# Patient Record
(demographics unavailable — no encounter records)

---

## 2024-10-10 NOTE — HISTORY OF PRESENT ILLNESS
[FreeTextEntry1] : Mr. SEVILLA 77 year old male presents for a new patient evaluation. Comes today with his wife. They come today for suspected stroke. They endorse over the last 2-3 years had 2-3 episodes where he would stare off and appeared frozen. Most recent episode occurred in the car, and was about 6-8 weeks ago. He refused to seek medical treatment at the time. Wife also notes over the past year he has weakness in the right leg, she finds him dragging the leg. Has been experiencing gait changes over the past few years. Wife also endorses falls. Was previously on gabapentin but never did PT. His lower back imaging in 2016 showed L4-L5 severe spinal canal stenosis 2/2 chronic degenerative changes,7mm synovial cyst emanating from the right facet joint at the level w extension to the right dorsal epidural space contributing to spinal canal stenosis and additional multilevel degenerative changes. He denies changes in bladder pattern as well as saddle anesthesia. Denies changes in his vision, speech, ha, or dizziness. He has numbness in BL feet localized to the toes which is chronic for many years. Some episodes of falling asleep while sitting, but does endorse snoring.

## 2024-10-10 NOTE — PHYSICAL EXAM
[FreeTextEntry1] : Mental status: Awake, alert and oriented x3.  Follows commands. Flat affect. No dysarthria, no aphasia.   Cranial nerves: Pupils equally round and reactive to light, visual fields full, no nystagmus, extraocular muscles intact, V1 through V3 intact bilaterally and symmetric, face symmetric, hearing intact to finger rub, palate elevation symmetric, tongue was midline. Motor:  MRC grading 5/5 b/l UE/LE.   strength 5/5.  Normal tone and bulk.  No abnormal movements. No bradykinesias. Sensation: Intact to light touch, temperature, vibration, and pinprick, except vibration absent at the toes BL Coordination: No dysmetria on finger-to-nose. Reflexes: 2+ in bilateral UE/LE. Gait: Wide based gait. Difficulty stepping on and off exam table. No ataxia.  Romberg negative  Repeat /72

## 2024-10-10 NOTE — ASSESSMENT
[FreeTextEntry1] : Mr. SEVILLA 77 year old male presents for a new patient evaluation for episodes of staring off/ appearing frozen, and ?right leg weakness/ gait changes. Chronic lumbar radiculopathy may be contributing to gait change. Will order MRI L spine to follow up on known radiculopathy as well as MRI head w/o to ensure no central events causing episodes of staring off/freezing and 48 hr EEG   Plan -MRI L spine w/o  -PT for balance and gait training -MRI head w/o  -48hr AEEG/REEG -Complementary neuropathy labs for chronic sensory changes in feet -Return in 2-3 mos with neurology attending

## 2025-04-24 NOTE — HISTORY OF PRESENT ILLNESS
[FreeTextEntry1] : Since last visit he had testing done below.  He did not do the physical therapy for his lower back.  He did not talk to his cardiologist about his syncopal episodes.  He had one episode recently where he was walking and then wife says he suddenly passed out.  woke up quickly afterwards but hit the left side of head  MRI brain done on 10/18/2024 was unremarkable MRI Lumbar spine done on 10/21/2024 showed severe spinal stenosis at L4/L5 and moderate stenosis at L3/L4 Ambulatory EEG 72 hour done on 10/28/2024 was normal  From initial evaluation by CANDELARIO Kruse on 10/10/2024: Mr. SEVILLA 77 year old male presents for a new patient evaluation. Comes today with his wife. They come today for suspected stroke. They endorse over the last 2-3 years had 2-3 episodes where he would stare off and appeared frozen. Most recent episode occurred in the car, and was about 6-8 weeks ago. He refused to seek medical treatment at the time. Wife also notes over the past year he has weakness in the right leg, she finds him dragging the leg. Has been experiencing gait changes over the past few years. Wife also endorses falls. Was previously on gabapentin but never did PT. His lower back imaging in 2016 showed L4-L5 severe spinal canal stenosis 2/2 chronic degenerative changes,7mm synovial cyst emanating from the right facet joint at the level w extension to the right dorsal epidural space contributing to spinal canal stenosis and additional multilevel degenerative changes. He denies changes in bladder pattern as well as saddle anesthesia. Denies changes in his vision, speech, ha, or dizziness. He has numbness in BL feet localized to the toes which is chronic for many years. Some episodes of falling asleep while sitting, but does endorse snoring.

## 2025-04-24 NOTE — ASSESSMENT
[FreeTextEntry1] : Mr. SEVILLA 78 year old male presents for followup of syncopal episodes and gait issues. The syncopal episodes dont appear to be neurological and may still be related to cardiac causes.  Would suggest 30d event and if negative ILR with EPS.  Gait issues likely related to weakness in right foot dorsiflexion and due too severe spinal stenosis.   Plan -PT for lumbar radiculopathy -Return in 3 mos with neurology attending

## 2025-07-28 NOTE — PHYSICAL EXAM
[FreeTextEntry1] : Mental status: Awake, alert and oriented x3. Follows commands. Flat affect. No dysarthria, no aphasia. Cranial nerves: Pupils equally round and reactive to light, visual fields full, no nystagmus, extraocular muscles intact, V1 through V3 intact bilaterally and symmetric, face symmetric, hearing intact to finger rub, palate elevation symmetric, tongue was midline. Motor: MRC grading 5/5 b/l UE/LE.  strength 5/5. Normal tone and bulk. No abnormal movements.  Sensation: Intact to light touch, temperature, vibration, and pinprick above the ankles Coordination: No dysmetria on finger-to-nose. Reflexes: 2+ in bilateral UE / LE. Gait: Wide based gait. Difficulty stepping on and off exam table. No ataxia.

## 2025-07-28 NOTE — ASSESSMENT
[FreeTextEntry1] : Mr. SEVILLA 78 year old male presents for a follow up visit established care re syncopal episodes, has had 5 episodes total no new events since last visit. Underwent holter montior w findings of tachycardia & skipped beats, has been recommended to start ASA. Unclear if planning for ILR.  Again recommended PT for Lower back/gait abnormality.    -MRA H/N w/o r/o vascular cause of syncopal events -Obtain records from cardiology; Dr. Melendez -Again recommended PT  -RTC 3-6 mo w Dr. Young Mendoza

## 2025-07-28 NOTE — HISTORY OF PRESENT ILLNESS
[FreeTextEntry1] : Mr. SEVILLA 78 year old male presents for a follow up visit. Last seen by Dr. Young Mendoza April 2025, who recommended cardiac eval for syncopal episodes and PT for L Spine.  Has since followed with Dr. Melendez. Underwent 30-day monitor which revealed tachycardia and skipping beats. Was recommended ASA 81 mg daily which he has not started. Unclear if cardio is considering ILR, has been previously recommended in our office. The patient has not yet completed physical therapy for gait issue/LBP. The patient has no further syncopal episodes and no further neurologic concerns today. As per wife cardio advised they are not convinced holter monitor findings are cause of syncope.